# Patient Record
Sex: FEMALE | Race: WHITE | NOT HISPANIC OR LATINO | URBAN - METROPOLITAN AREA
[De-identification: names, ages, dates, MRNs, and addresses within clinical notes are randomized per-mention and may not be internally consistent; named-entity substitution may affect disease eponyms.]

---

## 2018-08-21 ENCOUNTER — EMERGENCY (EMERGENCY)
Facility: HOSPITAL | Age: 23
LOS: 1 days | Discharge: HOME | End: 2018-08-21
Attending: EMERGENCY MEDICINE | Admitting: EMERGENCY MEDICINE
Payer: COMMERCIAL

## 2018-08-21 VITALS
OXYGEN SATURATION: 97 % | SYSTOLIC BLOOD PRESSURE: 123 MMHG | DIASTOLIC BLOOD PRESSURE: 77 MMHG | TEMPERATURE: 98 F | RESPIRATION RATE: 18 BRPM | WEIGHT: 119.93 LBS | HEART RATE: 87 BPM

## 2018-08-21 DIAGNOSIS — Z91.010 ALLERGY TO PEANUTS: ICD-10-CM

## 2018-08-21 DIAGNOSIS — T78.1XXA OTHER ADVERSE FOOD REACTIONS, NOT ELSEWHERE CLASSIFIED, INITIAL ENCOUNTER: ICD-10-CM

## 2018-08-21 PROCEDURE — 99284 EMERGENCY DEPT VISIT MOD MDM: CPT

## 2018-08-21 RX ORDER — FAMOTIDINE 10 MG/ML
20 INJECTION INTRAVENOUS DAILY
Qty: 0 | Refills: 0 | Status: DISCONTINUED | OUTPATIENT
Start: 2018-08-21 | End: 2018-08-21

## 2018-08-21 RX ORDER — DIPHENHYDRAMINE HCL 50 MG
1 CAPSULE ORAL
Qty: 12 | Refills: 0 | OUTPATIENT
Start: 2018-08-21 | End: 2018-08-23

## 2018-08-21 RX ORDER — EPINEPHRINE 0.3 MG/.3ML
0.3 INJECTION INTRAMUSCULAR; SUBCUTANEOUS
Qty: 1 | Refills: 0 | OUTPATIENT
Start: 2018-08-21 | End: 2018-08-21

## 2018-08-21 RX ORDER — FAMOTIDINE 10 MG/ML
1 INJECTION INTRAVENOUS
Qty: 10 | Refills: 0 | OUTPATIENT
Start: 2018-08-21 | End: 2018-08-25

## 2018-08-21 RX ORDER — IPRATROPIUM/ALBUTEROL SULFATE 18-103MCG
3 AEROSOL WITH ADAPTER (GRAM) INHALATION ONCE
Qty: 0 | Refills: 0 | Status: COMPLETED | OUTPATIENT
Start: 2018-08-21 | End: 2018-08-21

## 2018-08-21 RX ADMIN — FAMOTIDINE 20 MILLIGRAM(S): 10 INJECTION INTRAVENOUS at 10:51

## 2018-08-21 RX ADMIN — Medication 3 MILLILITER(S): at 10:51

## 2018-08-21 NOTE — ED PROVIDER NOTE - ENMT, MLM
Airway patent, Nasal mucosa clear. Mouth with normal mucosa. Throat has no vesicles, no oropharyngeal exudates and uvula is midline. no uvula angioedema, no periorbital edema, no palatal edema, no stridor, no drooling

## 2018-08-21 NOTE — ED ADULT NURSE NOTE - CHIEF COMPLAINT QUOTE
pt presents ambulatory from [facility] s/p allergic reaction for further evaluation. as per pt, pt has known peanut allergy and ate something that contained peanuts. pt gave herself 1 epi pen and 50 mg benadryl approx 1hour ago. at [facility] received 10mg Decadron and prednisone (1 pill) approx 30 min PTA . pt is speaking in full sentences, airway patent, lungs CTA.

## 2018-08-21 NOTE — ED ADULT NURSE NOTE - NSIMPLEMENTINTERV_GEN_ALL_ED
Implemented All Universal Safety Interventions:  [place] to call system. Call bell, personal items and telephone within reach. Instruct patient to call for assistance. Room bathroom lighting operational. Non-slip footwear when patient is off stretcher. Physically safe environment: no spills, clutter or unnecessary equipment. Stretcher in lowest position, wheels locked, appropriate side rails in place.

## 2018-08-21 NOTE — ED PROVIDER NOTE - MEDICAL DECISION MAKING DETAILS
Findings c/w mild allergic reaction, will observe and add H-2 block. Received 10 mg decadron at [facility]. Patient self medication w/ epi-pen and benadryl prior to [facility].

## 2018-08-21 NOTE — ED PROVIDER NOTE - OBJECTIVE STATEMENT
22 y/o F w/ no PMH, peanut allergy w/ anaphylaxis who consumed a granola bar w/ peanuts in it and began to develop chest tightness, throat itching, and hives 1 hour ago. She gave herself a shot of her epi-pen and 50 mg of benadryl  and was seen at [facility], given 10 mg Decadron, and subsequently sent here. States her symptoms have resolved, and she is currently asymptomatic. Denies drooling, difficulty swallowing, tongue swelling, facial swelling, or any other complaints.

## 2018-08-21 NOTE — ED ADULT TRIAGE NOTE - CHIEF COMPLAINT QUOTE
pt presents ambulatory from [facility] s/p allergic reaction for further evaluation. as per pt, pt has known peanut allergy and ate something that contained peanuts. pt gave herself 1 epi pen and 50 mg benadryl approx 1hour ago. at [facility] received 10mg Decadron and prednisone (1 pill) approx 30 min PTA . pt is speaking in full sentences, airway patent, lungs CTA.

## 2021-04-02 NOTE — ED ADULT TRIAGE NOTE - AS O2 DELIVERY
CERTIFICATE OF RETURN TO WORK    4/2/2021        Re: Jenny Lomeli  [address]  [facility] [id number]      This is to certify that Jenny Lomeli has been seen in my office on 4/2/2021 and should be able to take a break from standing every 2 hours as needed when she is having migraines.        SIGNATURE:___________________________________________,   4/2/2021        Leisa Espinosa MD  [facility]  Internal Medicine  [address] [place] [address] [phone]   room air

## 2021-09-30 NOTE — ED PROVIDER NOTE - CROS ED SKIN NEG
Spoke with pt and she reported difficulty dropping off the BRAVO monitor at the hospital and wanted to make sure someone found it and uploaded her study. She also reports after the EGD she coughed up a lot of dark colored phlegm.   no itch